# Patient Record
Sex: MALE | Race: WHITE | Employment: FULL TIME | URBAN - METROPOLITAN AREA
[De-identification: names, ages, dates, MRNs, and addresses within clinical notes are randomized per-mention and may not be internally consistent; named-entity substitution may affect disease eponyms.]

---

## 2017-08-02 ENCOUNTER — HOSPITAL ENCOUNTER (EMERGENCY)
Age: 22
Discharge: HOME | End: 2017-08-02
Attending: EMERGENCY MEDICINE
Payer: SELF-PAY

## 2017-08-02 VITALS
HEART RATE: 52 BPM | TEMPERATURE: 97.8 F | OXYGEN SATURATION: 98 % | SYSTOLIC BLOOD PRESSURE: 133 MMHG | DIASTOLIC BLOOD PRESSURE: 79 MMHG | WEIGHT: 150 LBS | RESPIRATION RATE: 16 BRPM | HEIGHT: 70 IN | BODY MASS INDEX: 21.47 KG/M2

## 2017-08-02 DIAGNOSIS — K08.89 DENTALGIA: Primary | ICD-10-CM

## 2017-08-02 PROCEDURE — 99283 EMERGENCY DEPT VISIT LOW MDM: CPT

## 2017-08-02 PROCEDURE — 74011250637 HC RX REV CODE- 250/637: Performed by: EMERGENCY MEDICINE

## 2017-08-02 RX ORDER — HYDROCODONE BITARTRATE AND ACETAMINOPHEN 5; 325 MG/1; MG/1
1 TABLET ORAL
Qty: 10 TAB | Refills: 0 | Status: SHIPPED | OUTPATIENT
Start: 2017-08-02

## 2017-08-02 RX ORDER — NAPROXEN 250 MG/1
500 TABLET ORAL
Status: COMPLETED | OUTPATIENT
Start: 2017-08-02 | End: 2017-08-02

## 2017-08-02 RX ORDER — PENICILLIN V POTASSIUM 500 MG/1
500 TABLET, FILM COATED ORAL 4 TIMES DAILY
Qty: 40 TAB | Refills: 0 | Status: SHIPPED | OUTPATIENT
Start: 2017-08-02

## 2017-08-02 RX ORDER — PENICILLIN V POTASSIUM 250 MG/1
500 TABLET, FILM COATED ORAL
Status: COMPLETED | OUTPATIENT
Start: 2017-08-02 | End: 2017-08-02

## 2017-08-02 RX ADMIN — NAPROXEN 500 MG: 250 TABLET ORAL at 06:48

## 2017-08-02 RX ADMIN — PENICILLIN V POTASSIUM 500 MG: 250 TABLET, FILM COATED ORAL at 06:48

## 2017-08-02 NOTE — ED PROVIDER NOTES
HPI Comments: Delroy Paitner, 23 y.o. male, presents ambulatory to [facility] ED with cc of 1 hour of right sided facial pain. Patient believes he has \"an abscessed tooth\" which causes him pain with swallowing. He also notes some drainage from the gums around the affected tooth. He does not have a regular dentist. Patient denies pmhx of HTN, DM, asthma, or sz. PCP: None    PMHx significant for: none  PSHx significant for: none  Social history significant for: - Tobacco, + (occasional) EtOH, - Illicit Drug Use    There are no other complaints, changes, or physical findings at this time. Written by ZIGGY Swann, as dictated by Elvira Drake MD.     The history is provided by the patient. No  was used. History reviewed. No pertinent past medical history. History reviewed. No pertinent surgical history. History reviewed. No pertinent family history. Social History     Social History    Marital status: SINGLE     Spouse name: N/A    Number of children: N/A    Years of education: N/A     Occupational History    Not on file. Social History Main Topics    Smoking status: Never Smoker    Smokeless tobacco: Never Used    Alcohol use No    Drug use: No    Sexual activity: Not on file     Other Topics Concern    Not on file     Social History Narrative         ALLERGIES: Review of patient's allergies indicates no known allergies. Review of Systems   Constitutional: Negative for chills and fever. HENT: Positive for dental problem. Negative for congestion and rhinorrhea. Positive for gum drainage    Eyes: Negative for photophobia and discharge. Respiratory: Negative for cough and shortness of breath. Cardiovascular: Negative for chest pain and palpitations. Gastrointestinal: Negative for diarrhea, nausea and vomiting. Genitourinary: Negative for dysuria and hematuria. Musculoskeletal: Negative for back pain and neck pain.    Skin: Negative for rash and wound. Allergic/Immunologic: Negative for immunocompromised state. Neurological: Negative for dizziness and light-headedness. Psychiatric/Behavioral: Negative for suicidal ideas. All other systems reviewed and are negative. Vitals:    08/02/17 0626   BP: 133/79   Pulse: (!) 52   Resp: 16   Temp: 97.8 °F (36.6 °C)   SpO2: 98%   Weight: 68 kg (150 lb)   Height: 5' 10\" (1.778 m)            Physical Exam   General: WDWN, no diaphoresis  Neurological: A O x 3, NAD, 5/5 strength all extremities  HEENT: PERRL. EOMI. Posterior pharynx clear. No buccal lesions. Poor dentition. Tender RUQ first molar. Cardiovasculary: RRR, no M/R/G   Respiratory: Lungs CTAB, no W/R/R  Abdomen: soft, NT, non-distended, bowel sounds nml   Back: No point tenderness or bony tenderness. No discoloration or swelling. No CVT. Extremities: Warm. No edema or atrophy  Skin: No rash or notable lesions  Heme: No bruises or petechia   Written by Renetta Montoya ED Scribteddy, as dictated by Wallace Sebastian MD.       MDM  Number of Diagnoses or Management Options  Diagnosis management comments:   Ddx: tooth abscess, gingivitis, dental jyoti, dental decay       Amount and/or Complexity of Data Reviewed  Review and summarize past medical records: yes    Patient Progress  Patient progress: stable    ED Course       Procedures      MEDICATIONS GIVEN:  Medications   naproxen (NAPROSYN) tablet 500 mg (not administered)   penicillin v potassium (VEETID) tablet 500 mg (not administered)       IMPRESSION:  1. Dentalgia        PLAN:  1. There are no discharge medications for this patient. 2.   Follow-up Information     Follow up With Details Comments Contact Info    [facility] [address] [address]  [facility]   [address]  [address] [facility] [address]      [facility]   [address] [facility] [address]  [phone]        Return to ED if worse     Discharge Note:  6:47 AM  The pt is ready for discharge. The pt's signs, symptoms, diagnosis, and discharge instructions have been discussed and pt has conveyed their understanding. The pt is to follow up as recommended or return to ER should their symptoms worsen. Plan has been discussed and pt is in agreement. This note is prepared by Gris Fernando, acting as a Scribe for Miguelito Lantigua MD.    Miguelito Lantigua MD: The scribe's documentation has been prepared under my direction and personally reviewed by me in its entirety. I confirm that the notes above accurately reflects all work, treatment, procedures, and medical decision making performed by me.

## 2017-08-02 NOTE — DISCHARGE INSTRUCTIONS
Tooth and Gum Pain: Care Instructions  Your Care Instructions    The most common causes of dental pain are tooth decay and gum disease. Pain can also be caused by an infection of the tooth (abscess) or the gums. Or you may have pain from a broken or cracked tooth. Other causes of pain include infection and damage to a tooth from nervous grinding of your teeth. A wisdom tooth can be painful when it is coming in but cannot break through the gum. It can also be painful when the tooth is only partway in and extra gum tissue has formed around it. The tissue can get inflamed (pericoronitis), and sometimes it gets infected. Prompt dental care can help find the cause of your toothache and keep the tooth from dying or gum disease from getting worse. Self-care at home may reduce your pain and discomfort. Follow-up care is a key part of your treatment and safety. Be sure to make and go to all appointments, and call your dentist or doctor if you are having problems. It's also a good idea to know your test results and keep a list of the medicines you take. How can you care for yourself at home? · To reduce pain and facial swelling, put an ice or cold pack on the outside of your cheek for 10 to 20 minutes at a time. Put a thin cloth between the ice and your skin. Do not use heat. · If your doctor prescribed antibiotics, take them as directed. Do not stop taking them just because you feel better. You need to take the full course of antibiotics. · Ask your doctor if you can take an over-the-counter pain medicine, such as acetaminophen (Tylenol), ibuprofen (Advil, Motrin), or naproxen (Aleve). Be safe with medicines. Read and follow all instructions on the label. · Avoid very hot, cold, or sweet foods and drinks if they increase your pain. · Rinse your mouth with warm salt water every 2 hours to help relieve pain and swelling. Mix 1 teaspoon of salt in 8 ounces of water.   · Talk to your dentist about using special toothpaste for sensitive teeth. To reduce pain on contact with heat or cold or when brushing, brush with this toothpaste regularly or rub a small amount of the paste on the sensitive area with a clean finger 2 or 3 times a day. Floss gently between your teeth. · Do not smoke or use spit tobacco. Tobacco use can make gum problems worse, decreases your ability to fight infection in your gums, and delays healing. If you need help quitting, talk to your doctor about stop-smoking programs and medicines. These can increase your chances of quitting for good. When should you call for help? Call 911 anytime you think you may need emergency care. For example, call if:  · You have trouble breathing. Call your dentist or doctor now or seek immediate medical care if:  · You have signs of infection, such as:  ¨ Increased pain, swelling, warmth, or redness. ¨ Red streaks leading from the area. ¨ Pus draining from the area. ¨ A fever. Watch closely for changes in your health, and be sure to contact your doctor if:  · You do not get better as expected. Where can you learn more? Go to http://mercedez-antonieta.info/. Enter [id number] [id number] in the search box to learn more about \"Tooth and Gum Pain: Care Instructions. \"  Current as of: August 11, 2016  Content Version: 11.3  © 2482-4042 [employer]. Care instructions adapted under license by [employer] (which disclaims liability or warranty for this information). If you have questions about a medical condition or this instruction, always ask your healthcare professional. [place] [address] any warranty or liability for your use of this information.

## 2017-08-02 NOTE — ED NOTES
Pt presents ambulatory to ED complaining of right sided tooth pain that started several months ago, he had a wisdom tooth removed that was needed and in the last several hours the pain has gotten much worse. Pt is alert and oriented x 4, RR even and unlabored, skin is warm and dry. Assesment completed and pt updated on plan of care. Emergency Department Nursing Plan of Care       The Nursing Plan of Care is developed from the Nursing assessment and Emergency Department Attending provider initial evaluation. The plan of care may be reviewed in the ED Provider note.     The Plan of Care was developed with the following considerations:   Patient / Family readiness to learn indicated by:verbalized understanding, successful return demonstration and appropriate questions asked  Persons(s) to be included in education: patient  Barriers to Learning/Limitations:No    Signed     Orion Hunt RN    8/2/2017   6:30 AM

## 2017-08-02 NOTE — ED NOTES
Discharge instructions were given to the patient by Emily Jean Baptiste RN  . The patient left the Emergency Department ambulatory, alert and oriented and in no acute distress with 2 prescriptions. The patient was encouraged to call or return to the ED for worsening issues or problems and was encouraged to schedule a follow up appointment for continuing care. The patient verbalized understanding of discharge instructions and prescriptions, all questions were answered. The patient has no further concerns at this time.

## 2017-08-02 NOTE — LETTER
[facility] EMERGENCY DEPT 
[address] [facility] [address] [facility] [address] [address] 
[phone] Work/School Note Date: 8/2/2017 To Whom It May concern: 
 
Elmer Knapp was seen and treated today in the emergency room by the following provider(s): 
No providers found. Elmer Knapp may return to work on 08/04/2017. Sincerely, Catrachito Packer RN

## 2019-06-05 ENCOUNTER — ED HISTORICAL/CONVERTED ENCOUNTER (OUTPATIENT)
Dept: OTHER | Age: 24
End: 2019-06-05

## 2019-06-17 ENCOUNTER — HOSPITAL ENCOUNTER (OUTPATIENT)
Dept: PHYSICAL THERAPY | Age: 24
End: 2019-06-17